# Patient Record
Sex: FEMALE
[De-identification: names, ages, dates, MRNs, and addresses within clinical notes are randomized per-mention and may not be internally consistent; named-entity substitution may affect disease eponyms.]

---

## 2020-04-27 PROBLEM — Z00.00 ENCOUNTER FOR PREVENTIVE HEALTH EXAMINATION: Status: ACTIVE | Noted: 2020-04-27

## 2020-04-28 ENCOUNTER — APPOINTMENT (OUTPATIENT)
Dept: PULMONOLOGY | Facility: CLINIC | Age: 45
End: 2020-04-28

## 2020-05-06 ENCOUNTER — APPOINTMENT (OUTPATIENT)
Dept: PULMONOLOGY | Facility: CLINIC | Age: 45
End: 2020-05-06
Payer: MEDICAID

## 2020-05-06 VITALS
HEART RATE: 76 BPM | HEIGHT: 64 IN | DIASTOLIC BLOOD PRESSURE: 80 MMHG | OXYGEN SATURATION: 100 % | WEIGHT: 125 LBS | SYSTOLIC BLOOD PRESSURE: 120 MMHG | BODY MASS INDEX: 21.34 KG/M2

## 2020-05-06 DIAGNOSIS — R05 COUGH: ICD-10-CM

## 2020-05-06 DIAGNOSIS — J18.9 PNEUMONIA, UNSPECIFIED ORGANISM: ICD-10-CM

## 2020-05-06 PROCEDURE — 99202 OFFICE O/P NEW SF 15 MIN: CPT

## 2020-05-06 RX ORDER — ALBUTEROL SULFATE 90 UG/1
108 (90 BASE) INHALANT RESPIRATORY (INHALATION)
Refills: 0 | Status: ACTIVE | COMMUNITY
Start: 2020-05-06

## 2020-05-06 NOTE — HISTORY OF PRESENT ILLNESS
[TextBox_4] : 43 yo female presents for evaluation of BOYKIN associated with PRN productive cough for over one month. Initially she complained of fever, chills, vomiting,headache with sore throat and cough. She was seen in the ER at [facility] on April 7,discharged on antibiotic and albuterol inhaler. Chest xray revealed bilateral infiltrates. Presently she denies fever,night sweats, weight loss or hemoptysis. She denies prior pulmonary problems.She was covid 19 negative a few weeks after her symptoms had resolved.

## 2020-05-06 NOTE — DISCUSSION/SUMMARY
[FreeTextEntry1] : 45 yo female with post infectious/inflammatory symptoms. She is to use albuterol MDI PRN alone for now. A repeat chest xray will be performed in the near future. PFT will also be performed. Despite negative covid 19 swab, serum antibodies for covid 19 will be checked in the future. Her  was present.

## 2020-05-06 NOTE — REVIEW OF SYSTEMS
[Fever] : fever [Cough] : cough [Sputum] : sputum [SOB on Exertion] : sob on exertion [Negative] : Endocrine

## 2020-08-10 ENCOUNTER — APPOINTMENT (OUTPATIENT)
Dept: PULMONOLOGY | Facility: CLINIC | Age: 45
End: 2020-08-10